# Patient Record
Sex: FEMALE | Race: WHITE | Employment: OTHER | URBAN - METROPOLITAN AREA
[De-identification: names, ages, dates, MRNs, and addresses within clinical notes are randomized per-mention and may not be internally consistent; named-entity substitution may affect disease eponyms.]

---

## 2021-05-12 ENCOUNTER — HOSPITAL ENCOUNTER (OUTPATIENT)
Dept: PHYSICAL THERAPY | Age: 61
Discharge: HOME | End: 2021-05-12
Payer: OTHER GOVERNMENT

## 2021-05-12 PROCEDURE — 97162 PT EVAL MOD COMPLEX 30 MIN: CPT

## 2021-05-12 PROCEDURE — 97110 THERAPEUTIC EXERCISES: CPT

## 2021-05-12 NOTE — PROGRESS NOTES
[address] [facility] AT [address], [address] [place] [address] Road  Phone: [phone]   [phone]  PLAN OF CARE / [address] [place] [address] PHYSICAL THERAPY SERVICES  Patient Name: Lan Castro : 1960   Medical   Diagnosis: R shoulder impingement Treatment Diagnosis: Right shoulder pain [M25.511]  Shoulder impingement, right [M75.41]   Onset Date: 3 weeks ago     Referral Source: Cyrus Howard DO Start of Care [facility]): 2021   Prior Hospitalization: See medical history Provider #: [id number]   Prior Level of Function: RHD; cares for 4 month granddaughter   Comorbidities: Osteoporosis, essential tremor, HTN, OA, depression; hx hysterectomy , rectocele repair x2 (, )   Medications: Verified on Patient Summary List   The Plan of Care and following information is based on the information from the initial evaluation.   ===========================================================================================  Assessment / key information: Pt is a 64 y/o F who presents to PT w/ c/o R shoulder pain, that has been present for about 3 weeks insidiously. Pt notes pain ranges 2 to 10/10, made worse with reaching overhead, behind back, carrying her 3month old granddaughter; better with meloxicam. Describes pain as sharp, aching, and throbbing, located R lateral delt and superior shoulder. Testing/imaging has included x-ray which she reports showed bursitis. Prior treatment has included nothing to date. Pt does report having a RCT, in which she received PT For > 20 years ago but is unsure if it was right or left shoulder. Red flags negative. Denies N/T, night pain. FOTO 47/100. Clinical Exam Findings:  POSTURE/OBSERVATION: B GHJ IR, scapular downward rot, protraction and ant tilt. guarded posturing, essential tremor noted.    ROM: shoulder AROM (seated) flex R 112 p! (L 168), abd R 105 (L 150), ER (scapular plane) R 61 (L 88), ext R 35 (L 56), FIR R L4 (L contralateral spine of scap). PROM R shoulder (supine) flex 110 p!, abd 103 p!, ER (scpaular plane) 65 p!, IR (scapular plane) abdomen p!. Thoracic ext minimal, RR 50%, LR 50%. STRENGTH: shoulder flex R 4-/5 (L 4/5), abd R 3-/5p! (L 4-/5), ER R 3+/5 p! (L 4-/5), IR R 4-/5 (L 4-/5). Mid trap R 2/5 (L 2+/5), lower trap R 1+/5 (L 2-/5), serratus ant R 4-/5 (L 4-/5); bicep R 4/5 ( L 4/5), tricep R 4-/5 (L 4-/5)  PALPATION: ttp to R LHB, pec minor, UT, infraspinatus, deltoid bursa. capsular mobility WNL all planes, relief reported to inf j mob. Poor scapular upward rotation and posterior tilt. SPECIAL TEST: (+) empty can R (pain and weakness); (+) speed's, (+) lion-nicky, (-) neer's. Pt presents w/ sx suggesting/consistent with R shoulder impingement/RC tendinopathy. Pt could benefit from PT to address impairments and functional limitations in order to improve pt's ability to complete ADLs, however pt has declined further therapy 2' high copay. Pt was provided with extensive HEP and advised to contact clinic/physician should sx worsen.   ===========================================================================================  Eval Complexity: History MEDIUM  Complexity : 1-2 comorbidities / personal factors will impact the outcome/ POC ;  Examination  MEDIUM Complexity : 3 Standardized tests and measures addressing body structure, function, activity limitation and / or participation in recreation ; Presentation MEDIUM Complexity : Evolving with changing characteristics ;   Decision Making MEDIUM Complexity : FOTO score of 26-74; Overall Complexity MEDIUM  Problem List: pain affecting function, decrease ROM, decrease strength, decrease ADL/ functional abilitiies, decrease activity tolerance and decrease flexibility/ joint mobility   Treatment Plan may include any combination of the following: Therapeutic exercise, Therapeutic activities, Neuromuscular re-education, Physical agent/modality, Manual therapy, Patient education, Self Care training, Functional mobility training and Home safety training  Patient / Family readiness to learn indicated by: asking questions, trying to perform skills and interest  Persons(s) to be included in education: patient (P)  Barriers to Learning/Limitations: None  Measures taken, if barriers to learning:    Patient Goal (s): \"get exercises to do at home\"   Patient self reported health status: fair  Rehabilitation Potential: fair due to requesting HEP only   Short Term Goals: To be accomplished in  1  weeks:  1. Establish HEP for self management of sx  Frequency / Duration:   Patient to be seen  1  times per week for 1  treatments:  Patient / Caregiver education and instruction: self care, activity modification and exercises  Therapist Signature: Roberto Guthrie PT Date: 8/60/5090   Certification Period: na Time: 3:24 PM   ===========================================================================================  I certify that the above Physical Therapy Services are being furnished while the patient is under my care. I agree with the treatment plan and certify that this therapy is necessary. Physician Signature:        Date:       Time:                         Robin Burciaga, DO  Please sign and return to [facility] or you may fax the signed copy to [phone]. Thank you.

## 2021-05-12 NOTE — PROGRESS NOTES
PHYSICAL THERAPY - DAILY TREATMENT NOTE    Patient Name: Shaquille Juares        Date: 2021  : 1960   yes Patient  Verified  Visit #:   1   of   1  Insurance: Payor:  / Plan: Aj Murillo 73 / Product Type:  /      In time: 4:30 Out time: 5:18   Total Treatment Time: 48     Medicare/[facility] Time Tracking (below)   Total Timed Codes (min):  na 1:1 Treatment Time:  na     TREATMENT AREA =  Right shoulder pain [M25.511]  Shoulder impingement, right [M75.41]    SUBJECTIVE  Pain Level (on 0 to 10 scale):  2  / 10   Medication Changes/New allergies or changes in medical history, any new surgeries or procedures?    no  If yes, update Summary List   Subjective Functional Status/Changes:  []  No changes reported     See POC          OBJECTIVE  Modalities Rationale:     decrease inflammation and decrease pain to improve patient's ability to reach   min [] Estim, type/location:                                      []  att     []  unatt     []  w/US     []  w/ice    []  w/heat    min []  Mechanical Traction: type/lbs                   []  pro   []  sup   []  int   []  cont    []  before manual    []  after manual    min []  Ultrasound, settings/location:      min []  Iontophoresis w/ dexamethasone, location:                                               []  take home patch       []  in clinic   10 min [x]  Ice     []  Heat    location/position:  To the R shoulder in short sit    min []  Vasopneumatic Device, press/temp:     min []  Other:    [x] Skin assessment post-treatment (if applicable):    [x]  intact    []  redness- no adverse reaction     []redness  adverse reaction:        15 min Therapeutic Exercise:  [x]  See flow sheet   Rationale:      increase ROM and increase strength to improve the patients ability to complete ADLs     Billed With/As:   [x] TE   [] TA   [] Neuro   [] Self Care Patient Education: [x] Review HEP    [] Progressed/Changed HEP based on:   [] positioning   [] body mechanics   [] transfers   [] heat/ice application    [] other:      Other Objective/Functional Measures:    See POC     Post Treatment Pain Level (on 0 to 10) scale:   3  / 10     ASSESSMENT  Assessment/Changes in Function:     See POC     []  See Progress Note/Recertification   Patient will continue to benefit from skilled PT services to modify and progress therapeutic interventions, address functional mobility deficits, address ROM deficits, address strength deficits, analyze and address soft tissue restrictions, analyze and cue movement patterns, analyze and modify body mechanics/ergonomics, assess and modify postural abnormalities and instruct in home and community integration to attain remaining goals.    Progress toward goals / Updated goals:    See POC     PLAN  []  Upgrade activities as tolerated yes Continue plan of care   []  Discharge due to :    []  Other:      Therapist: Nakul Adrian PT    Date: 5/12/2021 Time: 3:19 PM     Future Appointments   Date Time Provider Missy Conklin   5/12/2021  4:15 PM Rand Torres PT [facility]